# Patient Record
Sex: MALE | Race: BLACK OR AFRICAN AMERICAN | NOT HISPANIC OR LATINO | URBAN - METROPOLITAN AREA
[De-identification: names, ages, dates, MRNs, and addresses within clinical notes are randomized per-mention and may not be internally consistent; named-entity substitution may affect disease eponyms.]

---

## 2018-08-19 ENCOUNTER — EMERGENCY (EMERGENCY)
Facility: HOSPITAL | Age: 33
LOS: 0 days | Discharge: HOME | End: 2018-08-19
Attending: EMERGENCY MEDICINE
Payer: SELF-PAY

## 2018-08-19 VITALS
RESPIRATION RATE: 17 BRPM | DIASTOLIC BLOOD PRESSURE: 90 MMHG | SYSTOLIC BLOOD PRESSURE: 148 MMHG | OXYGEN SATURATION: 100 % | HEART RATE: 94 BPM

## 2018-08-19 VITALS
HEART RATE: 97 BPM | SYSTOLIC BLOOD PRESSURE: 164 MMHG | RESPIRATION RATE: 18 BRPM | WEIGHT: 315 LBS | HEIGHT: 73 IN | OXYGEN SATURATION: 96 % | DIASTOLIC BLOOD PRESSURE: 105 MMHG | TEMPERATURE: 98 F

## 2018-08-19 DIAGNOSIS — J45.41 MODERATE PERSISTENT ASTHMA WITH (ACUTE) EXACERBATION: ICD-10-CM

## 2018-08-19 DIAGNOSIS — R06.02 SHORTNESS OF BREATH: ICD-10-CM

## 2018-08-19 LAB
BASE EXCESS BLDA CALC-SCNC: -0.6 MMOL/L — SIGNIFICANT CHANGE UP (ref -2–2)
BLOOD GAS COMMENTS: SIGNIFICANT CHANGE UP
BLOOD GAS COMMENTS: SIGNIFICANT CHANGE UP
BLOOD GAS SOURCE: SIGNIFICANT CHANGE UP
HCO3 BLDA-SCNC: 23 MMOL/L — SIGNIFICANT CHANGE UP (ref 21–29)
HOROWITZ INDEX BLDA+IHG-RTO: 21 — SIGNIFICANT CHANGE UP
PCO2 BLDA: 38 MMHG — SIGNIFICANT CHANGE UP (ref 32–46)
PH BLD: 7.4 — SIGNIFICANT CHANGE UP (ref 7.35–7.45)
PO2 BLDA: 76 MMHG — SIGNIFICANT CHANGE UP (ref 74–108)
SAO2 % BLDA: 96 % — SIGNIFICANT CHANGE UP (ref 92–96)

## 2018-08-19 PROCEDURE — 99284 EMERGENCY DEPT VISIT MOD MDM: CPT

## 2018-08-19 PROCEDURE — 71045 X-RAY EXAM CHEST 1 VIEW: CPT | Mod: 26

## 2018-08-19 RX ORDER — ALBUTEROL 90 UG/1
2.5 AEROSOL, METERED ORAL
Qty: 0 | Refills: 0 | Status: COMPLETED | OUTPATIENT
Start: 2018-08-19 | End: 2018-08-19

## 2018-08-19 RX ORDER — ALBUTEROL 90 UG/1
2 AEROSOL, METERED ORAL
Qty: 1 | Refills: 0 | OUTPATIENT
Start: 2018-08-19 | End: 2018-08-20

## 2018-08-19 RX ADMIN — ALBUTEROL 2.5 MILLIGRAM(S): 90 AEROSOL, METERED ORAL at 14:03

## 2018-08-19 RX ADMIN — ALBUTEROL 2.5 MILLIGRAM(S): 90 AEROSOL, METERED ORAL at 13:35

## 2018-08-19 RX ADMIN — ALBUTEROL 2.5 MILLIGRAM(S): 90 AEROSOL, METERED ORAL at 13:50

## 2018-08-19 RX ADMIN — Medication 80 MILLIGRAM(S): at 14:03

## 2018-08-19 NOTE — ED ADULT NURSE NOTE - OBJECTIVE STATEMENT
received pt sitting on stretcher alert and oriented  c/o sob  on and off for the 3 weeks +cough denies any fever

## 2018-08-19 NOTE — ED PROVIDER NOTE - MEDICAL DECISION MAKING DETAILS
patient pw sob. this may be a cough variant of asthma given the absence of wheezing. will give nebs and steroids. patient pw sob. this may be a cough variant of asthma given the absence of wheezing. will give nebs and steroids. patient feels better after treatment. dc home.

## 2018-08-19 NOTE — ED PROVIDER NOTE - PHYSICAL EXAMINATION
Gen: Alert, NAD, morbidly obese  Head: NC, AT   Eyes: PERRL, EOMI, normal lids/conjunctiva  ENT: normal hearing, patent oropharynx without erythema/exudate, uvula midline  Neck: supple, no tenderness, Trachea midline  Pulm: Bilateral BS, normal resp effort, no wheeze/stridor/retractions. dry cough at bedside   CV: RRR, no M/R/G, 2+ radial and dp pulses bl, no edema  Abd: soft, NT/ND, +BS, no hepatosplenomegaly  Mskel: extremities x4 with normal ROM and no joint effusions. no ctl spine ttp.   Skin: no rash, no bruising   Neuro: AAOx3, no sensory/motor deficits, CN 2-12 intact

## 2018-08-19 NOTE — ED PROVIDER NOTE - OBJECTIVE STATEMENT
Pertinent PMH/PSH/FHx/SHx and Review of Systems contained within:  33F hx of asthma and morbid obesity pw sob ongoing x2 weeks. patient notes he has been treating symptoms with albuterol inhaler but without relief. he has been avoiding coming to the er because he does not like hospitals. he denies productive cough but has a dry one. no nausea, vomiting, fever, chills, rash, bleeding, ha, vision change, rhinorrhea. he is a non smoker   Fh and Sh not otherwise contributory  ROS otherwise negative

## 2018-08-20 PROBLEM — Z00.00 ENCOUNTER FOR PREVENTIVE HEALTH EXAMINATION: Status: ACTIVE | Noted: 2018-08-20

## 2018-08-20 RX ORDER — ALBUTEROL 90 UG/1
0 AEROSOL, METERED ORAL
Qty: 0 | Refills: 0 | COMMUNITY

## 2018-09-13 ENCOUNTER — EMERGENCY (EMERGENCY)
Facility: HOSPITAL | Age: 33
LOS: 1 days | Discharge: HOME | End: 2018-09-13
Attending: EMERGENCY MEDICINE
Payer: SELF-PAY

## 2018-09-13 VITALS
HEIGHT: 72 IN | HEART RATE: 74 BPM | SYSTOLIC BLOOD PRESSURE: 129 MMHG | OXYGEN SATURATION: 98 % | RESPIRATION RATE: 18 BRPM | DIASTOLIC BLOOD PRESSURE: 80 MMHG | WEIGHT: 315 LBS | TEMPERATURE: 98 F

## 2018-09-13 PROCEDURE — 73610 X-RAY EXAM OF ANKLE: CPT

## 2018-09-13 PROCEDURE — 73590 X-RAY EXAM OF LOWER LEG: CPT | Mod: 26,RT

## 2018-09-13 PROCEDURE — 99284 EMERGENCY DEPT VISIT MOD MDM: CPT

## 2018-09-13 PROCEDURE — 73590 X-RAY EXAM OF LOWER LEG: CPT

## 2018-09-13 PROCEDURE — 73610 X-RAY EXAM OF ANKLE: CPT | Mod: 26,RT

## 2018-09-13 RX ORDER — IBUPROFEN 200 MG
1 TABLET ORAL
Qty: 30 | Refills: 0
Start: 2018-09-13 | End: 2018-09-22

## 2018-09-13 RX ORDER — IBUPROFEN 200 MG
800 TABLET ORAL ONCE
Refills: 0 | Status: COMPLETED | OUTPATIENT
Start: 2018-09-13 | End: 2018-09-13

## 2018-09-13 RX ADMIN — Medication 800 MILLIGRAM(S): at 12:00

## 2018-09-13 NOTE — ED PROVIDER NOTE - OBJECTIVE STATEMENT
32 yo M pmh Asthma BIB EMS s/p injury while walking down bus steps. Pt states that his ankle got caught on back on step and twisted. Pt c/o pain to right leg and ankle. No weakness or paresthesias.

## 2018-09-13 NOTE — ED PROVIDER NOTE - MUSCULOSKELETAL MINIMAL EXAM
+ ttp prox tibia and diffuse ankle. NO defor. limited rom. No pain with foot or knee./RANGE OF MOTION LIMITED

## 2018-09-13 NOTE — ED ADULT TRIAGE NOTE - CHIEF COMPLAINT QUOTE
Hit foot on step while getting off bus.  heard crack in right lower leg/ankle.  Did not hit head, takes no blood thinners.  Right ankle splint in place.

## 2018-09-13 NOTE — ED PROVIDER NOTE - ATTENDING CONTRIBUTION TO CARE
seen with acp: well appearing male, NAD; right lower ext +ttp lateral knee/proximal fib; no deformity; no ankle/malleoli tenderness; +neurovasc intact; +fibular fracture noted, no medial malleoli fx; knee immobilizer, crutches, non weight bearing, outpt f/u with ortho

## 2018-09-14 PROBLEM — J45.909 UNSPECIFIED ASTHMA, UNCOMPLICATED: Chronic | Status: ACTIVE | Noted: 2018-08-19

## 2018-09-17 ENCOUNTER — APPOINTMENT (OUTPATIENT)
Dept: ORTHOPEDIC SURGERY | Facility: CLINIC | Age: 33
End: 2018-09-17
Payer: OTHER MISCELLANEOUS

## 2018-09-17 VITALS
DIASTOLIC BLOOD PRESSURE: 86 MMHG | WEIGHT: 315 LBS | HEART RATE: 98 BPM | BODY MASS INDEX: 42.66 KG/M2 | SYSTOLIC BLOOD PRESSURE: 126 MMHG | HEIGHT: 72 IN

## 2018-09-17 DIAGNOSIS — M25.579 PAIN IN UNSPECIFIED ANKLE AND JOINTS OF UNSPECIFIED FOOT: ICD-10-CM

## 2018-09-17 DIAGNOSIS — Z87.09 PERSONAL HISTORY OF OTHER DISEASES OF THE RESPIRATORY SYSTEM: ICD-10-CM

## 2018-09-17 PROCEDURE — 73610 X-RAY EXAM OF ANKLE: CPT | Mod: RT

## 2018-09-17 PROCEDURE — 27780 TREATMENT OF FIBULA FRACTURE: CPT | Mod: RT

## 2018-09-17 PROCEDURE — 99204 OFFICE O/P NEW MOD 45 MIN: CPT | Mod: 57

## 2018-09-17 RX ORDER — IBUPROFEN 600 MG
600 TABLET ORAL
Refills: 0 | Status: ACTIVE | COMMUNITY

## 2018-09-18 ENCOUNTER — OTHER (OUTPATIENT)
Age: 33
End: 2018-09-18

## 2018-09-26 ENCOUNTER — OTHER (OUTPATIENT)
Age: 33
End: 2018-09-26

## 2018-10-09 ENCOUNTER — APPOINTMENT (OUTPATIENT)
Dept: ORTHOPEDIC SURGERY | Facility: CLINIC | Age: 33
End: 2018-10-09
Payer: OTHER MISCELLANEOUS

## 2018-10-09 ENCOUNTER — OTHER (OUTPATIENT)
Age: 33
End: 2018-10-09

## 2018-10-09 VITALS
HEIGHT: 72 IN | SYSTOLIC BLOOD PRESSURE: 158 MMHG | WEIGHT: 315 LBS | BODY MASS INDEX: 42.66 KG/M2 | DIASTOLIC BLOOD PRESSURE: 100 MMHG | HEART RATE: 98 BPM

## 2018-10-09 DIAGNOSIS — S82.839A OTHER FRACTURE OF UPPER AND LOWER END OF UNSPECIFIED FIBULA, INITIAL ENCOUNTER FOR CLOSED FRACTURE: ICD-10-CM

## 2018-10-09 PROCEDURE — 99024 POSTOP FOLLOW-UP VISIT: CPT

## 2018-10-09 RX ORDER — TRAMADOL HYDROCHLORIDE 50 MG/1
50 TABLET, COATED ORAL
Qty: 30 | Refills: 0 | Status: ACTIVE | COMMUNITY
Start: 2018-10-09 | End: 1900-01-01

## 2018-10-10 ENCOUNTER — APPOINTMENT (OUTPATIENT)
Dept: ORTHOPEDIC SURGERY | Facility: CLINIC | Age: 33
End: 2018-10-10
Payer: OTHER MISCELLANEOUS

## 2018-10-10 VITALS
BODY MASS INDEX: 42.66 KG/M2 | HEART RATE: 97 BPM | HEIGHT: 72 IN | WEIGHT: 315 LBS | SYSTOLIC BLOOD PRESSURE: 149 MMHG | DIASTOLIC BLOOD PRESSURE: 96 MMHG

## 2018-10-10 DIAGNOSIS — S93.431A SPRAIN OF TIBIOFIBULAR LIGAMENT OF RIGHT ANKLE, INITIAL ENCOUNTER: ICD-10-CM

## 2018-10-10 PROCEDURE — 73610 X-RAY EXAM OF ANKLE: CPT | Mod: RT

## 2018-10-10 PROCEDURE — 77071 MNL APPL STRS JT RADIOGRAPHY: CPT

## 2018-10-10 PROCEDURE — 99214 OFFICE O/P EST MOD 30 MIN: CPT

## 2018-10-24 ENCOUNTER — OUTPATIENT (OUTPATIENT)
Dept: OUTPATIENT SERVICES | Facility: HOSPITAL | Age: 33
LOS: 1 days | End: 2018-10-24
Payer: COMMERCIAL

## 2018-10-24 VITALS — HEIGHT: 72 IN | WEIGHT: 315 LBS

## 2018-10-24 DIAGNOSIS — Z83.2 FAMILY HISTORY OF DISEASES OF THE BLOOD AND BLOOD-FORMING ORGANS AND CERTAIN DISORDERS INVOLVING THE IMMUNE MECHANISM: Chronic | ICD-10-CM

## 2018-10-24 DIAGNOSIS — S93.431A SPRAIN OF TIBIOFIBULAR LIGAMENT OF RIGHT ANKLE, INITIAL ENCOUNTER: ICD-10-CM

## 2018-10-24 DIAGNOSIS — Z29.9 ENCOUNTER FOR PROPHYLACTIC MEASURES, UNSPECIFIED: ICD-10-CM

## 2018-10-24 DIAGNOSIS — J45.909 UNSPECIFIED ASTHMA, UNCOMPLICATED: ICD-10-CM

## 2018-10-24 DIAGNOSIS — Z01.818 ENCOUNTER FOR OTHER PREPROCEDURAL EXAMINATION: ICD-10-CM

## 2018-10-24 DIAGNOSIS — Z82.49 FAMILY HISTORY OF ISCHEMIC HEART DISEASE AND OTHER DISEASES OF THE CIRCULATORY SYSTEM: Chronic | ICD-10-CM

## 2018-10-24 LAB
ANION GAP SERPL CALC-SCNC: 13 MMOL/L — SIGNIFICANT CHANGE UP (ref 5–17)
APTT BLD: 31.8 SEC — SIGNIFICANT CHANGE UP (ref 27.5–37.4)
BASOPHILS # BLD AUTO: 0 K/UL — SIGNIFICANT CHANGE UP (ref 0–0.2)
BASOPHILS NFR BLD AUTO: 0.3 % — SIGNIFICANT CHANGE UP (ref 0–2)
BUN SERPL-MCNC: 10 MG/DL — SIGNIFICANT CHANGE UP (ref 8–20)
CALCIUM SERPL-MCNC: 9.9 MG/DL — SIGNIFICANT CHANGE UP (ref 8.6–10.2)
CHLORIDE SERPL-SCNC: 100 MMOL/L — SIGNIFICANT CHANGE UP (ref 98–107)
CO2 SERPL-SCNC: 27 MMOL/L — SIGNIFICANT CHANGE UP (ref 22–29)
CREAT SERPL-MCNC: 0.77 MG/DL — SIGNIFICANT CHANGE UP (ref 0.5–1.3)
EOSINOPHIL # BLD AUTO: 0.2 K/UL — SIGNIFICANT CHANGE UP (ref 0–0.5)
EOSINOPHIL NFR BLD AUTO: 2.1 % — SIGNIFICANT CHANGE UP (ref 0–6)
GLUCOSE SERPL-MCNC: 111 MG/DL — SIGNIFICANT CHANGE UP (ref 70–115)
HCT VFR BLD CALC: 44.8 % — SIGNIFICANT CHANGE UP (ref 42–52)
HGB BLD-MCNC: 14.7 G/DL — SIGNIFICANT CHANGE UP (ref 14–18)
INR BLD: 1.04 RATIO — SIGNIFICANT CHANGE UP (ref 0.88–1.16)
LYMPHOCYTES # BLD AUTO: 2.9 K/UL — SIGNIFICANT CHANGE UP (ref 1–4.8)
LYMPHOCYTES # BLD AUTO: 39.6 % — SIGNIFICANT CHANGE UP (ref 20–55)
MCHC RBC-ENTMCNC: 26.2 PG — LOW (ref 27–31)
MCHC RBC-ENTMCNC: 32.8 G/DL — SIGNIFICANT CHANGE UP (ref 32–36)
MCV RBC AUTO: 79.9 FL — LOW (ref 80–94)
MONOCYTES # BLD AUTO: 0.7 K/UL — SIGNIFICANT CHANGE UP (ref 0–0.8)
MONOCYTES NFR BLD AUTO: 9.2 % — SIGNIFICANT CHANGE UP (ref 3–10)
NEUTROPHILS # BLD AUTO: 3.5 K/UL — SIGNIFICANT CHANGE UP (ref 1.8–8)
NEUTROPHILS NFR BLD AUTO: 48.8 % — SIGNIFICANT CHANGE UP (ref 37–73)
PLATELET # BLD AUTO: 305 K/UL — SIGNIFICANT CHANGE UP (ref 150–400)
POTASSIUM SERPL-MCNC: 4.2 MMOL/L — SIGNIFICANT CHANGE UP (ref 3.5–5.3)
POTASSIUM SERPL-SCNC: 4.2 MMOL/L — SIGNIFICANT CHANGE UP (ref 3.5–5.3)
PROTHROM AB SERPL-ACNC: 11.4 SEC — SIGNIFICANT CHANGE UP (ref 9.8–12.7)
RBC # BLD: 5.61 M/UL — SIGNIFICANT CHANGE UP (ref 4.6–6.2)
RBC # FLD: 13.9 % — SIGNIFICANT CHANGE UP (ref 11–15.6)
SODIUM SERPL-SCNC: 140 MMOL/L — SIGNIFICANT CHANGE UP (ref 135–145)
WBC # BLD: 7.2 K/UL — SIGNIFICANT CHANGE UP (ref 4.8–10.8)
WBC # FLD AUTO: 7.2 K/UL — SIGNIFICANT CHANGE UP (ref 4.8–10.8)

## 2018-10-24 PROCEDURE — 85730 THROMBOPLASTIN TIME PARTIAL: CPT

## 2018-10-24 PROCEDURE — G0463: CPT

## 2018-10-24 PROCEDURE — 85027 COMPLETE CBC AUTOMATED: CPT

## 2018-10-24 PROCEDURE — 80048 BASIC METABOLIC PNL TOTAL CA: CPT

## 2018-10-24 PROCEDURE — 36415 COLL VENOUS BLD VENIPUNCTURE: CPT

## 2018-10-24 PROCEDURE — 85610 PROTHROMBIN TIME: CPT

## 2018-10-24 NOTE — H&P PST ADULT - NSANTHOSAYNRD_GEN_A_CORE
No. JAKUB screening performed.  STOP BANG Legend: 0-2 = LOW Risk; 3-4 = INTERMEDIATE Risk; 5-8 = HIGH Risk

## 2018-10-24 NOTE — H&P PST ADULT - HISTORY OF PRESENT ILLNESS
34 y/o morbidly obese pt seen today for pre-op repair syndesmosis right ankle. Pt a , fell, twisting his right lower extremity. Was seen at [facility]  ED with a follow-up Orthopedic. Pt now requires surgery due to worsening right lower extremity/ lumbar pain, difficulty with balance and activities of daily living. Accompanied today by his mother, KATHE Cortes in used.

## 2018-10-24 NOTE — H&P PST ADULT - ACTIVITY
no formal exercise, active, no  sob with climbing up the stairs no formal exercise, inactive, sob with climbing up the stairs

## 2018-10-24 NOTE — H&P PST ADULT - FAMILY HISTORY
Mother  Still living? Yes, Estimated age: 31-40  Family history of systemic lupus erythematosus (SLE) in mother, Age at diagnosis: Age Unknown  Family history of hypertension in mother, Age at diagnosis: Age Unknown     Father  Still living? Yes, Estimated age: Age Unknown  Family history of hypertension in mother, Age at diagnosis: Age Unknown

## 2018-10-24 NOTE — H&P PST ADULT - ASSESSMENT
CAPRINI SCORE [CLOT]    AGE RELATED RISK FACTORS                                                       MOBILITY RELATED FACTORS  [ ] Age 41-60 years                                            (1 Point)                  [ ] Bed rest                                                        (1 Point)  [ ] Age: 61-74 years                                           (2 Points)                 [ ] Plaster cast                                                   (2 Points)  [ ] Age= 75 years                                              (3 Points)                 [ ] Bed bound for more than 72 hours                 (2 Points)    DISEASE RELATED RISK FACTORS                                               GENDER SPECIFIC FACTORS  [ x] Edema in the lower extremities                       (1 Point)                  [ ] Pregnancy                                                     (1 Point)  [ ] Varicose veins                                               (1 Point)                  [ ] Post-partum < 6 weeks                                   (1 Point)             [ x] BMI > 25 Kg/m2                                            (1 Point)                  [ ] Hormonal therapy  or oral contraception          (1 Point)                 [ ] Sepsis (in the previous month)                        (1 Point)                  [ ] History of pregnancy complications                 (1 point)  [ ] Pneumonia or serious lung disease                                               [ ] Unexplained or recurrent                     (1 Point)           (in the previous month)                               (1 Point)  [ ] Abnormal pulmonary function test                     (1 Point)                 SURGERY RELATED RISK FACTORS  [ ] Acute myocardial infarction                              (1 Point)                 [ ]  Section                                             (1 Point)  [ ] Congestive heart failure (in the previous month)  (1 Point)               [ ] Minor surgery                                                  (1 Point)   [ ] Inflammatory bowel disease                             (1 Point)                 [ ] Arthroscopic surgery                                        (2 Points)  [ ] Central venous access                                      (2 Points)                [x ] General surgery lasting more than 45 minutes   (2 Points)       [ ] Stroke (in the previous month)                          (5 Points)               [ ] Elective arthroplasty                                         (5 Points)                                                                                                                                               HEMATOLOGY RELATED FACTORS                                                 TRAUMA RELATED RISK FACTORS  [ ] Prior episodes of VTE                                     (3 Points)                [ ] Fracture of the hip, pelvis, or leg                       (5 Points)  [ ] Positive family history for VTE                         (3 Points)                 [ ] Acute spinal cord injury (in the previous month)  (5 Points)  [ ] Prothrombin 82196 A                                     (3 Points)                 [ ] Paralysis  (less than 1 month)                             (5 Points)  [ ] Factor V Leiden                                             (3 Points)                  [ ] Multiple Trauma within 1 month                        (5 Points)  [ ] Lupus anticoagulants                                     (3 Points)                                                           [ ] Anticardiolipin antibodies                               (3 Points)                                                       [ ] High homocysteine in the blood                      (3 Points)                                             [ ] Other congenital or acquired thrombophilia      (3 Points)                                                [ ] Heparin induced thrombocytopenia                  (3 Points)                                          Total Score [  4        ] 32 y/o male seen today for pre-op Repair syndesmosis right ankle, surgery protocol reviewed with pt today, pt to follow-up with PCP for medical clearance   CAPRINI SCORE [CLOT]    AGE RELATED RISK FACTORS                                                       MOBILITY RELATED FACTORS  [ ] Age 41-60 years                                            (1 Point)                  [ ] Bed rest                                                        (1 Point)  [ ] Age: 61-74 years                                           (2 Points)                 [ ] Plaster cast                                                   (2 Points)  [ ] Age= 75 years                                              (3 Points)                 [ ] Bed bound for more than 72 hours                 (2 Points)    DISEASE RELATED RISK FACTORS                                               GENDER SPECIFIC FACTORS  [ x] Edema in the lower extremities                       (1 Point)                  [ ] Pregnancy                                                     (1 Point)  [ ] Varicose veins                                               (1 Point)                  [ ] Post-partum < 6 weeks                                   (1 Point)             [ x] BMI > 25 Kg/m2                                            (1 Point)                  [ ] Hormonal therapy  or oral contraception          (1 Point)                 [ ] Sepsis (in the previous month)                        (1 Point)                  [ ] History of pregnancy complications                 (1 point)  [ ] Pneumonia or serious lung disease                                               [ ] Unexplained or recurrent                     (1 Point)           (in the previous month)                               (1 Point)  [ ] Abnormal pulmonary function test                     (1 Point)                 SURGERY RELATED RISK FACTORS  [ ] Acute myocardial infarction                              (1 Point)                 [ ]  Section                                             (1 Point)  [ ] Congestive heart failure (in the previous month)  (1 Point)               [ ] Minor surgery                                                  (1 Point)   [ ] Inflammatory bowel disease                             (1 Point)                 [ ] Arthroscopic surgery                                        (2 Points)  [ ] Central venous access                                      (2 Points)                [x ] General surgery lasting more than 45 minutes   (2 Points)       [ ] Stroke (in the previous month)                          (5 Points)               [ ] Elective arthroplasty                                         (5 Points)                                                                                                                                               HEMATOLOGY RELATED FACTORS                                                 TRAUMA RELATED RISK FACTORS  [ ] Prior episodes of VTE                                     (3 Points)                [ ] Fracture of the hip, pelvis, or leg                       (5 Points)  [ ] Positive family history for VTE                         (3 Points)                 [ ] Acute spinal cord injury (in the previous month)  (5 Points)  [ ] Prothrombin 79227 A                                     (3 Points)                 [ ] Paralysis  (less than 1 month)                             (5 Points)  [ ] Factor V Leiden                                             (3 Points)                  [ ] Multiple Trauma within 1 month                        (5 Points)  [ ] Lupus anticoagulants                                     (3 Points)                                                           [ ] Anticardiolipin antibodies                               (3 Points)                                                       [ ] High homocysteine in the blood                      (3 Points)                                             [ ] Other congenital or acquired thrombophilia      (3 Points)                                                [ ] Heparin induced thrombocytopenia                  (3 Points)                                          Total Score [  4        ]

## 2018-10-25 ENCOUNTER — TRANSCRIPTION ENCOUNTER (OUTPATIENT)
Age: 33
End: 2018-10-25

## 2018-10-25 ENCOUNTER — FORM ENCOUNTER (OUTPATIENT)
Age: 33
End: 2018-10-25

## 2018-10-26 ENCOUNTER — OUTPATIENT (OUTPATIENT)
Dept: INPATIENT UNIT | Facility: HOSPITAL | Age: 33
LOS: 1 days | End: 2018-10-26
Payer: COMMERCIAL

## 2018-10-26 ENCOUNTER — APPOINTMENT (OUTPATIENT)
Dept: ORTHOPEDIC SURGERY | Facility: HOSPITAL | Age: 33
End: 2018-10-26
Payer: OTHER MISCELLANEOUS

## 2018-10-26 VITALS
TEMPERATURE: 97 F | RESPIRATION RATE: 22 BRPM | DIASTOLIC BLOOD PRESSURE: 60 MMHG | HEART RATE: 89 BPM | SYSTOLIC BLOOD PRESSURE: 117 MMHG | OXYGEN SATURATION: 97 %

## 2018-10-26 VITALS
HEART RATE: 105 BPM | TEMPERATURE: 97 F | OXYGEN SATURATION: 98 % | DIASTOLIC BLOOD PRESSURE: 95 MMHG | SYSTOLIC BLOOD PRESSURE: 127 MMHG | WEIGHT: 315 LBS | RESPIRATION RATE: 18 BRPM | HEIGHT: 72 IN

## 2018-10-26 DIAGNOSIS — S93.431A SPRAIN OF TIBIOFIBULAR LIGAMENT OF RIGHT ANKLE, INITIAL ENCOUNTER: ICD-10-CM

## 2018-10-26 PROCEDURE — 27784 TREATMENT OF FIBULA FRACTURE: CPT | Mod: RT

## 2018-10-26 PROCEDURE — ZZZZZ: CPT

## 2018-10-26 PROCEDURE — 27814 TREATMENT OF ANKLE FRACTURE: CPT | Mod: RT

## 2018-10-26 PROCEDURE — 27829 TREAT LOWER LEG JOINT: CPT | Mod: RT

## 2018-10-26 PROCEDURE — C1713: CPT

## 2018-10-26 PROCEDURE — 76000 FLUOROSCOPY <1 HR PHYS/QHP: CPT | Mod: 26

## 2018-10-26 RX ORDER — ONDANSETRON 8 MG/1
4 TABLET, FILM COATED ORAL ONCE
Refills: 0 | Status: COMPLETED | OUTPATIENT
Start: 2018-10-26 | End: 2018-10-26

## 2018-10-26 RX ORDER — ASPIRIN/CALCIUM CARB/MAGNESIUM 324 MG
1 TABLET ORAL
Qty: 60 | Refills: 0
Start: 2018-10-26 | End: 2018-11-24

## 2018-10-26 RX ORDER — SODIUM CHLORIDE 9 MG/ML
1000 INJECTION, SOLUTION INTRAVENOUS
Refills: 0 | Status: DISCONTINUED | OUTPATIENT
Start: 2018-10-26 | End: 2018-10-26

## 2018-10-26 RX ORDER — CEFAZOLIN SODIUM 1 G
3000 VIAL (EA) INJECTION ONCE
Refills: 0 | Status: DISCONTINUED | OUTPATIENT
Start: 2018-10-26 | End: 2018-10-26

## 2018-10-26 RX ORDER — KETOROLAC TROMETHAMINE 30 MG/ML
30 SYRINGE (ML) INJECTION ONCE
Refills: 0 | Status: DISCONTINUED | OUTPATIENT
Start: 2018-10-26 | End: 2018-10-26

## 2018-10-26 RX ORDER — FENTANYL CITRATE 50 UG/ML
50 INJECTION INTRAVENOUS
Refills: 0 | Status: DISCONTINUED | OUTPATIENT
Start: 2018-10-26 | End: 2018-10-26

## 2018-10-26 RX ADMIN — FENTANYL CITRATE 50 MICROGRAM(S): 50 INJECTION INTRAVENOUS at 16:09

## 2018-10-26 RX ADMIN — FENTANYL CITRATE 50 MICROGRAM(S): 50 INJECTION INTRAVENOUS at 16:20

## 2018-10-26 RX ADMIN — FENTANYL CITRATE 50 MICROGRAM(S): 50 INJECTION INTRAVENOUS at 16:59

## 2018-10-26 RX ADMIN — ONDANSETRON 4 MILLIGRAM(S): 8 TABLET, FILM COATED ORAL at 16:17

## 2018-10-26 RX ADMIN — Medication 30 MILLIGRAM(S): at 16:59

## 2018-10-26 NOTE — BRIEF OPERATIVE NOTE - PROCEDURE
<<-----Click on this checkbox to enter Procedure Fixation of syndesmosis of ankle  10/26/2018    Active  MNEGEM

## 2018-10-26 NOTE — ASU DISCHARGE PLAN (ADULT/PEDIATRIC). - MEDICATION SUMMARY - MEDICATIONS TO TAKE
I will START or STAY ON the medications listed below when I get home from the hospital:    Ecotrin 325 mg oral delayed release tablet  -- 1 tab(s) by mouth 2 times a day   -- Swallow whole.  Do not crush.  Take with food or milk.    -- Indication: For Sprain of tibiofibular ligament of right ankle    Tylenol 500 mg oral tablet  -- 2 tab(s) by mouth every 6 hours, As Needed  -- Indication: For Sprain of tibiofibular ligament of right ankle    albuterol  -- Indication: For Sprain of tibiofibular ligament of right ankle

## 2018-10-30 ENCOUNTER — OTHER (OUTPATIENT)
Age: 33
End: 2018-10-30

## 2018-11-06 ENCOUNTER — OTHER (OUTPATIENT)
Age: 33
End: 2018-11-06

## 2018-11-07 ENCOUNTER — OUTPATIENT (OUTPATIENT)
Dept: OUTPATIENT SERVICES | Facility: HOSPITAL | Age: 33
LOS: 1 days | End: 2018-11-07

## 2018-11-07 DIAGNOSIS — Z51.89 ENCOUNTER FOR OTHER SPECIFIED AFTERCARE: ICD-10-CM

## 2018-11-15 ENCOUNTER — APPOINTMENT (OUTPATIENT)
Dept: ORTHOPEDIC SURGERY | Facility: CLINIC | Age: 33
End: 2018-11-15
Payer: OTHER MISCELLANEOUS

## 2018-11-15 VITALS
DIASTOLIC BLOOD PRESSURE: 95 MMHG | BODY MASS INDEX: 42.66 KG/M2 | HEIGHT: 72 IN | HEART RATE: 94 BPM | SYSTOLIC BLOOD PRESSURE: 142 MMHG | WEIGHT: 315 LBS

## 2018-11-15 PROCEDURE — 99024 POSTOP FOLLOW-UP VISIT: CPT

## 2018-11-20 ENCOUNTER — OUTPATIENT (OUTPATIENT)
Dept: OUTPATIENT SERVICES | Facility: HOSPITAL | Age: 33
LOS: 1 days | End: 2018-11-20
Payer: COMMERCIAL

## 2018-11-20 DIAGNOSIS — S93.431D SPRAIN OF TIBIOFIBULAR LIGAMENT OF RIGHT ANKLE, SUBSEQUENT ENCOUNTER: ICD-10-CM

## 2018-11-20 DIAGNOSIS — Z51.89 ENCOUNTER FOR OTHER SPECIFIED AFTERCARE: ICD-10-CM

## 2018-12-03 ENCOUNTER — OTHER (OUTPATIENT)
Age: 33
End: 2018-12-03

## 2018-12-05 ENCOUNTER — APPOINTMENT (OUTPATIENT)
Dept: ORTHOPEDIC SURGERY | Facility: CLINIC | Age: 33
End: 2018-12-05
Payer: OTHER MISCELLANEOUS

## 2018-12-05 VITALS
DIASTOLIC BLOOD PRESSURE: 98 MMHG | HEART RATE: 106 BPM | BODY MASS INDEX: 42.66 KG/M2 | SYSTOLIC BLOOD PRESSURE: 147 MMHG | HEIGHT: 72 IN | WEIGHT: 315 LBS

## 2018-12-05 PROCEDURE — 73610 X-RAY EXAM OF ANKLE: CPT | Mod: RT

## 2018-12-05 PROCEDURE — 99024 POSTOP FOLLOW-UP VISIT: CPT

## 2019-01-03 ENCOUNTER — OTHER (OUTPATIENT)
Age: 34
End: 2019-01-03

## 2019-01-07 ENCOUNTER — APPOINTMENT (OUTPATIENT)
Dept: ORTHOPEDIC SURGERY | Facility: CLINIC | Age: 34
End: 2019-01-07
Payer: OTHER MISCELLANEOUS

## 2019-01-07 VITALS
HEART RATE: 91 BPM | DIASTOLIC BLOOD PRESSURE: 99 MMHG | SYSTOLIC BLOOD PRESSURE: 139 MMHG | BODY MASS INDEX: 42.66 KG/M2 | WEIGHT: 315 LBS | HEIGHT: 72 IN

## 2019-01-07 PROCEDURE — 99024 POSTOP FOLLOW-UP VISIT: CPT

## 2019-01-09 ENCOUNTER — OTHER (OUTPATIENT)
Age: 34
End: 2019-01-09

## 2019-02-05 ENCOUNTER — APPOINTMENT (OUTPATIENT)
Dept: ORTHOPEDIC SURGERY | Facility: CLINIC | Age: 34
End: 2019-02-05
Payer: OTHER MISCELLANEOUS

## 2019-02-05 VITALS
TEMPERATURE: 98.4 F | HEART RATE: 91 BPM | BODY MASS INDEX: 42.66 KG/M2 | HEIGHT: 72 IN | WEIGHT: 315 LBS | SYSTOLIC BLOOD PRESSURE: 142 MMHG | DIASTOLIC BLOOD PRESSURE: 98 MMHG

## 2019-02-05 PROCEDURE — 73610 X-RAY EXAM OF ANKLE: CPT | Mod: RT

## 2019-02-05 PROCEDURE — 99214 OFFICE O/P EST MOD 30 MIN: CPT

## 2019-02-05 NOTE — DISCUSSION/SUMMARY
[de-identified] : 32yo s/p right ankle syndesmosis repair .  Overall he is doing reasonably well but has had a setback consistent with an ankle sprain.\par \par The patient presents with an acute inversion injury to the right ankle with an injury to the medial ligamentous complex. I outlined a treatment protocol that will require relative rest over the next 2 weeks. In addition, I discussed the spectrum of sprain injuries; the majority of which responded well to nonoperative modalities of treatment, and the possible indication for surgical management if the ankle becomes chronically and grossly unstable. Nonoperative modalities of treatment include relative rest over the next 2 weeks, NSAID's, ice, compressive wraps and gradual return to weight bearing.\par \par If they are still symptomatic in a few weeks, they may return for repeat evaluation to determine whether lace-up ankle bracing is required for return to activity versus formal physical therapy for balance/proprioceptive training if the patient is still struggling with their injury. At this time, radiographs show no evidence of fracture, and I have recommended weightbearing as tolerated. We'll see the patient back as needed to determine further treatment modalities if needed.\par \par The patient was given the opportunity to ask questions and all questions were answered to their satisfaction.\par \par Brian Canada MD\par Orthopaedic Trauma Surgeon\par [facility]\par [facility] [place]\par \par \par \par

## 2019-02-05 NOTE — HISTORY OF PRESENT ILLNESS
[de-identified] : 33-year-old male presents status post ORIF of the right ankle DOS 10/16/18 .  He reports about 3 weeks ago his ankle gave out causing pain to the medial aspect of the ankle. He reports he has been taking NSAID/tylenol/tramadol with some relief of the pain.  he has not returned to work and is apprehensive to RTW due to the ankle giving out. \par \par The patient states the pain is made worse with activity and relieved with rest. aching, 4/10

## 2019-02-05 NOTE — PHYSICAL EXAM
[de-identified] : Physical Exam:\par General: Well appearing, no acute distress, A&O\par Neurologic: A&Ox3, No focal deficits\par Head: NCAT without abrasions, lacerations, or ecchymosis to head, face, or scalp\par Eyes: No scleral icterus, no gross abnormalities\par Respiratory: Equal chest wall expansion bilaterally, no accessory muscle use\par Lymphatic: No lymphadenopathy palpated\par Skin: Warm and dry\par Psychiatric: Normal mood and affect\par \par RLE exam Mild TTP to the medial aspect of the R ankle Ankle ROM without significant pain.  Dorsiflexion 20 degrees plantar flexion 30 degrees.  distal motor and sensation intact.  [de-identified] : Plain films of the right ankle were obtained today and compared to imaging from previous visit. There are 2 syndesmosis fixation devices without loss of alignment

## 2019-02-14 ENCOUNTER — OTHER (OUTPATIENT)
Age: 34
End: 2019-02-14

## 2019-02-26 ENCOUNTER — APPOINTMENT (OUTPATIENT)
Dept: ORTHOPEDIC SURGERY | Facility: CLINIC | Age: 34
End: 2019-02-26
Payer: OTHER MISCELLANEOUS

## 2019-02-26 VITALS
SYSTOLIC BLOOD PRESSURE: 160 MMHG | WEIGHT: 315 LBS | BODY MASS INDEX: 42.66 KG/M2 | DIASTOLIC BLOOD PRESSURE: 114 MMHG | HEART RATE: 96 BPM | HEIGHT: 72 IN | TEMPERATURE: 98.6 F

## 2019-02-26 PROCEDURE — 99214 OFFICE O/P EST MOD 30 MIN: CPT

## 2019-02-26 PROCEDURE — 73610 X-RAY EXAM OF ANKLE: CPT | Mod: RT

## 2019-02-26 NOTE — PHYSICAL EXAM
[de-identified] : Physical Exam:\par General: Well appearing, no acute distress, A&O\par Neurologic: A&Ox3, No focal deficits\par Head: NCAT without abrasions, lacerations, or ecchymosis to head, face, or scalp\par Respiratory: Equal chest wall expansion bilaterally, no accessory muscle use\par Lymphatic: No lymphadenopathy palpated\par Skin: Warm and dry\par Psychiatric: Normal mood and affect\par \par Right ankle exam\par \par Skin: Clean, dry, intact\par Inspection: No obvious malalignment, moderate swelling, no effusion\par Pulses: 2+ DP/PT pulses\par ROM: RIGHT neutral degrees of dorsiflexion limited by pain, [\par Tenderness: No tenderness over the lateral malleolus, + CFL/ATFL/PTFL pain. + medial malleolus pain, no deltoid ligament pain. + proximal fibular pain. No heel pain.\par Stability: Negative anterior/posterior drawer.\par Strength: 5/5 TA/GS/EHL\par Neuro: In tact to light touch throughout [de-identified] : Plain films of the right ankle were obtained today and compared to imaging from previous visit. There are 2 syndesmosis fixation devices without loss of alignment

## 2019-02-26 NOTE — DISCUSSION/SUMMARY
[de-identified] : 32yo s/p right ankle syndesmosis repair.  Overall he is doing reasonably well but has had issues with continued ankle pain.  I agree with the SERGIO that he has recovered significantly but is still not able to return to full duty. The SERGIO recommended temporary partial 33% disability and recommended light duty only. I feel this is appropriate for now. He'll followup as recommended by workers compensation.\par \par The patient was given the opportunity to ask questions and all questions were answered to their satisfaction.\par \par Brian Canada MD\par Orthopaedic Trauma Surgeon\par [facility]\par [facility] [place]\par \par \par \par

## 2019-02-26 NOTE — HISTORY OF PRESENT ILLNESS
[Bending] : worsened by bending [Lifting] : worsened by lifting [Recumbency] : relieved by recumbency [Rest] : relieved by rest [de-identified] : 33-year-old male presents status post ORIF of the right ankle DOS 10/16/18 .  He reports that his ankle has been improving but does have pain to the medial aspect of the ankle. He reports he has been taking NSAID/tylenol/tramadol with some relief of the pain.  he has not returned to work and is apprehensive to RTW due to the ankle giving out. He saw the workers comp SERGIO who agreed with the plan.\par \par The patient states the pain is made worse with activity and relieved with rest. aching, 3/10

## 2019-03-25 ENCOUNTER — APPOINTMENT (OUTPATIENT)
Dept: ORTHOPEDIC SURGERY | Facility: CLINIC | Age: 34
End: 2019-03-25
Payer: OTHER MISCELLANEOUS

## 2019-03-25 VITALS
SYSTOLIC BLOOD PRESSURE: 153 MMHG | BODY MASS INDEX: 42.66 KG/M2 | HEIGHT: 72 IN | WEIGHT: 315 LBS | DIASTOLIC BLOOD PRESSURE: 101 MMHG | HEART RATE: 94 BPM

## 2019-03-25 DIAGNOSIS — S82.891D OTHER FRACTURE OF RIGHT LOWER LEG, SUBSEQUENT ENCOUNTER FOR CLOSED FRACTURE WITH ROUTINE HEALING: ICD-10-CM

## 2019-03-25 PROCEDURE — 73610 X-RAY EXAM OF ANKLE: CPT | Mod: RT

## 2019-03-25 PROCEDURE — 99214 OFFICE O/P EST MOD 30 MIN: CPT

## 2019-03-25 NOTE — REASON FOR VISIT
[Follow-Up Visit] : a follow-up visit for [Other: ____] : [unfilled] [FreeTextEntry2] : S/P ORIFof right ankle fracture.\par  Repair of right syndesmosis injury. DOS:10/26/18\par

## 2019-03-25 NOTE — HISTORY OF PRESENT ILLNESS
[de-identified] : 33-year-old male presents status post ORIF of the right ankle DOS 10/16/18 .  He reports that his ankle has been improving but does have pain to the medial aspect of the ankle. He reports he has been taking NSAID/tylenol/tramadol with some relief of the pain.  he has returned to work light duty and is apprehensive to RTW full duty quite yet due to the ankle giving out. He saw the workers comp SERGIO who agreed with the plan.\par \par The patient states the pain is made worse with activity and relieved with rest. aching, 3/10

## 2019-03-25 NOTE — DISCUSSION/SUMMARY
[de-identified] : 32yo s/p right ankle syndesmosis repair.  Overall he is doing reasonably well but has had issues with continued ankle pain.  I agree with the SERGIO that he has recovered significantly but is still not able to return to full duty. The SERGIO recommended temporary partial 33% disability and recommended light duty only but may advance as tolerated. I feel this is appropriate for now. He'll followup as recommended by workers compensation.\par \par The patient was given the opportunity to ask questions and all questions were answered to their satisfaction.\par \par Brian Canada MD\par Orthopaedic Trauma Surgeon\par [facility]\par [facility] [place]\par \par \par \par

## 2019-03-25 NOTE — PHYSICAL EXAM
[de-identified] : Physical Exam:\par General: Well appearing, no acute distress, A&O\par Neurologic: A&Ox3, No focal deficits\par Head: NCAT without abrasions, lacerations, or ecchymosis to head, face, or scalp\par Respiratory: Equal chest wall expansion bilaterally, no accessory muscle use\par Lymphatic: No lymphadenopathy palpated\par Skin: Warm and dry\par Psychiatric: Normal mood and affect\par \par Right ankle exam\par \par Skin: Clean, dry, intact\par Inspection: No obvious malalignment, moderate swelling, no effusion\par Pulses: 2+ DP/PT pulses\par ROM: RIGHT neutral degrees of dorsiflexion limited by pain, \par Tenderness: No tenderness over the lateral malleolus, + CFL/ATFL/PTFL pain. + medial malleolus pain, no deltoid ligament pain. + proximal fibular pain. No heel pain.\par Stability: Negative anterior/posterior drawer.\par Strength: 5/5 TA/GS/EHL\par Neuro: In tact to light touch throughout [de-identified] : Plain films of the right ankle were obtained today and compared to imaging from previous visit. There are 2 syndesmosis fixation devices without loss of alignment

## 2019-04-27 PROCEDURE — 97010 HOT OR COLD PACKS THERAPY: CPT

## 2019-04-27 PROCEDURE — 97140 MANUAL THERAPY 1/> REGIONS: CPT

## 2019-04-27 PROCEDURE — 97110 THERAPEUTIC EXERCISES: CPT

## 2019-04-27 PROCEDURE — 97162 PT EVAL MOD COMPLEX 30 MIN: CPT

## 2019-04-30 ENCOUNTER — OUTPATIENT (OUTPATIENT)
Dept: OUTPATIENT SERVICES | Facility: HOSPITAL | Age: 34
LOS: 1 days | End: 2019-04-30
Payer: COMMERCIAL

## 2019-05-22 PROCEDURE — 97140 MANUAL THERAPY 1/> REGIONS: CPT

## 2019-05-22 PROCEDURE — 97010 HOT OR COLD PACKS THERAPY: CPT

## 2019-05-22 PROCEDURE — 97110 THERAPEUTIC EXERCISES: CPT

## 2019-06-17 DIAGNOSIS — S93.431D SPRAIN OF TIBIOFIBULAR LIGAMENT OF RIGHT ANKLE, SUBSEQUENT ENCOUNTER: ICD-10-CM

## 2019-06-19 ENCOUNTER — OTHER (OUTPATIENT)
Age: 34
End: 2019-06-19

## 2019-06-25 ENCOUNTER — OTHER (OUTPATIENT)
Age: 34
End: 2019-06-25

## 2019-09-17 ENCOUNTER — OTHER (OUTPATIENT)
Age: 34
End: 2019-09-17

## 2019-09-18 ENCOUNTER — APPOINTMENT (OUTPATIENT)
Dept: ORTHOPEDIC SURGERY | Facility: CLINIC | Age: 34
End: 2019-09-18

## 2019-09-19 ENCOUNTER — OTHER (OUTPATIENT)
Age: 34
End: 2019-09-19

## 2019-10-15 ENCOUNTER — OTHER (OUTPATIENT)
Age: 34
End: 2019-10-15

## 2019-10-15 ENCOUNTER — APPOINTMENT (OUTPATIENT)
Dept: ORTHOPEDIC SURGERY | Facility: CLINIC | Age: 34
End: 2019-10-15

## 2020-02-07 NOTE — ED PROCEDURE NOTE - NS ED PERI NEURO NEG
The patient is a 19y Female complaining of foot pain/injury.
Pre-application: Motor, sensory, and vascular responses intact in the injured extremity./Post-application: Motor, sensory, and vascular responses intact in the injured extremity.

## 2020-07-07 NOTE — ASU DISCHARGE PLAN (ADULT/PEDIATRIC). - MEDICATION SUMMARY - MEDICATIONS TO CHANGE
Okay, perfect.   Then culture it is.   Thank you, David.     Roxann Marr MD     I will SWITCH the dose or number of times a day I take the medications listed below when I get home from the hospital:  None

## 2020-12-14 ENCOUNTER — NON-APPOINTMENT (OUTPATIENT)
Age: 35
End: 2020-12-14

## 2020-12-15 ENCOUNTER — APPOINTMENT (OUTPATIENT)
Dept: ORTHOPEDIC SURGERY | Facility: CLINIC | Age: 35
End: 2020-12-15

## 2020-12-15 VITALS — TEMPERATURE: 97.1 F

## 2020-12-30 PROBLEM — Z00.00 ENCOUNTER FOR PREVENTIVE HEALTH EXAMINATION: Noted: 2020-12-30

## 2020-12-31 ENCOUNTER — APPOINTMENT (OUTPATIENT)
Dept: ORTHOPEDIC SURGERY | Facility: CLINIC | Age: 35
End: 2020-12-31
Payer: COMMERCIAL

## 2020-12-31 ENCOUNTER — APPOINTMENT (OUTPATIENT)
Dept: ORTHOPEDIC SURGERY | Facility: CLINIC | Age: 35
End: 2020-12-31

## 2020-12-31 ENCOUNTER — TRANSCRIPTION ENCOUNTER (OUTPATIENT)
Age: 35
End: 2020-12-31

## 2020-12-31 VITALS
TEMPERATURE: 96.1 F | HEART RATE: 96 BPM | DIASTOLIC BLOOD PRESSURE: 94 MMHG | WEIGHT: 315 LBS | BODY MASS INDEX: 44.1 KG/M2 | SYSTOLIC BLOOD PRESSURE: 147 MMHG | HEIGHT: 71 IN

## 2020-12-31 DIAGNOSIS — M25.561 PAIN IN RIGHT KNEE: ICD-10-CM

## 2020-12-31 PROCEDURE — 99072 ADDL SUPL MATRL&STAF TM PHE: CPT

## 2020-12-31 PROCEDURE — 73562 X-RAY EXAM OF KNEE 3: CPT | Mod: RT

## 2020-12-31 PROCEDURE — 99214 OFFICE O/P EST MOD 30 MIN: CPT

## 2020-12-31 RX ORDER — NAPROXEN 500 MG/1
500 TABLET ORAL
Qty: 28 | Refills: 0 | Status: ACTIVE | COMMUNITY
Start: 2020-12-31 | End: 1900-01-01

## 2020-12-31 NOTE — DISCUSSION/SUMMARY
[de-identified] : Assessment: 35-year-old male with right knee pain secondary to a grade 1 MCL sprain and possible medial meniscus tear\par \par Plan: I had a long discussion with the patient today regarding the nature of their diagnosis and treatment plan.  We discussed the risks and benefits of no treatment as well as nonoperative and operative treatments.  I reviewed the patient's x-rays today with him in the office which are negative for any acute pathology.  At this time I recommend conservative treatment of the patient's condition with modalities including rest, ice, heat, anti-inflammatory medications, activity modifications, and home stretching and strengthening exercises daily.  Prescription for naproxen was sent to patient's pharmacy today.  I discussed with the patient the risks and benefits associated with NSAID use.  A referral for physical therapy was also provided begin working on range of motion and strengthening exercises for his knee.  Patient was too large for our largest short runner brace so my orthotist was contacted today and he will deliver a custom sized brace for him in the near future.  Patient has been ambulating as tolerated without assistive devices so he may continue to do so at this point.  He is encouraged use crutches if he is having significant discomfort for the next 2 weeks.  Recommend follow-up in 8 weeks for repeat clinical evaluation.  If by the time the patient remains symptomatic we will order an MRI of the knee.  Patient verbalizes understanding and agrees with the plan.  All questions were answered to his satisfaction.

## 2020-12-31 NOTE — PHYSICAL EXAM
[de-identified] : General:\par Awake, alert, no acute distress, Patient was cooperative and appropriate during the examination.\par \par The patient is morbidly obese for height and age.\par \par Walks with an antalgic gait on the right side.\par \par Full, painless range of motion of the neck and back.\par \par Exam of the bilateral lower extremities is intact and symmetric with regards to dermatologic, vascular, and neurologic exam. Bilateral lower extremity sensation is grossly intact to light touch in the DP/SP/T/S/S nerve distributions. Intact DF/PF/EHL. BIlateral lower extremity warm and well-perfused with brisk capillary refill.\par \par Pulmonary:\par Regular, nonlabored breathing\par \par Abdomen:\par Soft, nontender, nondistended.\par \par Lymphatic:\par No evidence of inguinal lymphadenopathy\par \par Right knee Examination:\par Physical examination of the knee demonstrates normal skin without signs of skin changes or abnormalities. No erythema or warmth is appreciated. There is a mild to moderate joint effusion.\par \par Sensation is intact to light touch L2-S1\par Palpable DP/PT pulse\par EHL/FHL/TA/GSC motor function intact\par \par Range of Motion\par 0 to 120 degrees with pain at terminal flexion and limited by body habitus\par \par Strength Testing\par Quadriceps/Hamstring 5/5\par Patient is able to perform a straight leg raise without difficulty.\par \par Palpation\par Not tender to palpation about the proximal tibia, or patella\par Moderately tender to palpation about the medial epicondyle or distal femur.  No tenderness palpation about the MCL insertion on the tibia.\par Not palpable defect appreciated in the quadriceps or patellar tendons\par Moderately tender to palpation of medial joint line\par Mildly tender to palpation of lateral joint line\par \par Special Tests\par Anterior Drawer negative\par Posterior Drawer negative\par Lachman Exam negative\par No Varus at zero or 30 degrees of knee flexion.  No valgus laxity at 0 degrees of knee flexion.  Grade 1 valgus laxity at 30 degrees of knee flexion.\par Jacquie's Test negative \par Active compression of the patella is negative for pain\par Translation of the patella 2 quadrants with a firm endpoint [de-identified] : X-rays including three views of the right knee were obtained in the office and reviewed with the patient today.  There is no acute fracture or dislocation.  There are mild early arthritic changes in the medial compartment joint space narrowing and early osteophyte formation.

## 2020-12-31 NOTE — HISTORY OF PRESENT ILLNESS
[de-identified] : Derrick is a pleasant 35-year-old male  presents my office today complaining of right knee pain.  He states that he slipped in the snow and his knee bent inwards approximately 2 to 3 weeks ago.  He had immediate pain and swelling in the knee and has had difficulty walking.  His pain has improved over the past several weeks, however.  He has been wearing an over-the-counter brace which is somewhat helped.  He has been ambulating without assistive devices.  He is taking anti-inflammatories as needed.  He has had no therapy or injections.  The patient denies any fevers, chills, sweats, recent illnesses, numbness, tingling, weakness, or pain elsewhere at this time.

## 2021-01-12 RX ORDER — ALBUTEROL 90 UG/1
0 AEROSOL, METERED ORAL
Qty: 0 | Refills: 0 | DISCHARGE

## 2021-01-12 RX ORDER — ACETAMINOPHEN 500 MG
2 TABLET ORAL
Qty: 0 | Refills: 0 | DISCHARGE

## 2021-08-23 PROBLEM — S99.911A UNSPECIFIED INJURY OF RIGHT ANKLE, INITIAL ENCOUNTER: Chronic | Status: ACTIVE | Noted: 2018-10-24

## 2021-08-23 PROBLEM — E66.01 MORBID (SEVERE) OBESITY DUE TO EXCESS CALORIES: Chronic | Status: ACTIVE | Noted: 2018-10-24

## 2021-08-23 PROBLEM — J45.909 UNSPECIFIED ASTHMA, UNCOMPLICATED: Chronic | Status: ACTIVE | Noted: 2018-10-24

## 2021-09-08 ENCOUNTER — APPOINTMENT (OUTPATIENT)
Dept: ORTHOPEDIC SURGERY | Facility: CLINIC | Age: 36
End: 2021-09-08
Payer: COMMERCIAL

## 2021-09-08 VITALS
DIASTOLIC BLOOD PRESSURE: 110 MMHG | SYSTOLIC BLOOD PRESSURE: 151 MMHG | HEART RATE: 110 BPM | WEIGHT: 315 LBS | HEIGHT: 71 IN | BODY MASS INDEX: 44.1 KG/M2

## 2021-09-08 PROCEDURE — 73030 X-RAY EXAM OF SHOULDER: CPT | Mod: RT

## 2021-09-08 PROCEDURE — 99213 OFFICE O/P EST LOW 20 MIN: CPT

## 2021-09-08 RX ORDER — MELOXICAM 15 MG/1
15 TABLET ORAL
Qty: 21 | Refills: 0 | Status: ACTIVE | COMMUNITY
Start: 2021-09-08 | End: 1900-01-01

## 2021-09-08 NOTE — DISCUSSION/SUMMARY
[de-identified] : Assessment: 36-year-old male with right shoulder pain\par \par Plan: I had a long discussion with the patient today regarding the nature of their diagnosis and treatment plan.  We discussed the risks and benefits of no treatment as well as nonoperative and operative treatments.  I reviewed the patient's x-rays today with him in the office which demonstrate calcific tendinitis.  He also has a type III acromion.  Patient has positive impingement signs on examination.  At this time I recommend conservative treatment of the patient's condition with modalities including rest, ice, heat, anti-inflammatory medications, activity modifications, and home stretching and strengthening exercises daily.  A prescription for meloxicam was sent to the patient's pharmacy today.  I discussed with the patient the risks and benefits associated with NSAID use.  Referral for physical therapy was provided to begin working on exercises for his shoulder to help improve his pain and his function.  Recommend follow-up in 8 weeks for repeat clinical evaluation.  The patient continues to have pain at the time we will consider either an injection or an MRI of the shoulder.\par \par The patient verbalizes understanding and agrees with plan.  All questions were answered to their satisfaction.

## 2021-09-08 NOTE — HISTORY OF PRESENT ILLNESS
[de-identified] : 9/8/21: Derrick is a pleasant 36-year-old male  who returns to the office today for new complaints of a right shoulder injury.  Patient states that he was lifting an umbrella and help his mother set up outdoor furniture on 4 July when the umbrella got away from him and he felt a tearing sensation in his shoulder.  Since that time he has had worsening pain and difficulty with activities that involve use of the shoulder.  The pain is activity related and alleviated by rest.  Hurts to reach overhead or behind him.  It hurts to sleep on his right side at night.  He has been taking Motrin as needed for pain which is mildly helpful.  He has had no therapy or injections for his shoulder before.  The patient denies any fevers, chills, sweats, recent illnesses, numbness, tingling, weakness, or pain elsewhere at this time.\par \par 12/31/20: Derrick is a pleasant 35-year-old male  presents my office today complaining of right knee pain.  He states that he slipped in the snow and his knee bent inwards approximately 2 to 3 weeks ago.  He had immediate pain and swelling in the knee and has had difficulty walking.  His pain has improved over the past several weeks, however.  He has been wearing an over-the-counter brace which is somewhat helped.  He has been ambulating without assistive devices.  He is taking anti-inflammatories as needed.  He has had no therapy or injections.  The patient denies any fevers, chills, sweats, recent illnesses, numbness, tingling, weakness, or pain elsewhere at this time.

## 2021-09-08 NOTE — PHYSICAL EXAM
[de-identified] : General:\par Awake, alert, no acute distress, Patient was cooperative and appropriate during the examination.\par \par The patient is morbidly obese for height and age.\par \par Walks without an antalgic gait.\par \par Full, painless range of motion of the neck and back.\par \par Exam of the bilateral lower extremities is intact and symmetric with regards to dermatologic, vascular, and neurologic exam. Bilateral lower extremity sensation is grossly intact to light touch in the DP/SP/T/S/S nerve distributions. Intact DF/PF/EHL. BIlateral lower extremities warm and well-perfused with brisk capillary refill.\par \par \par Pulmonary:\par Regular, nonlabored breathing\par \par Abdomen:\par Soft, nontender, nondistended.\par \par Lymphatic:\par No evidence of axillary lymphadenopathy\par \par Right shoulder Exam:\par Physical exam of the shoulder demonstrates normal skin without signs of skin changes or abnormalities. No erythema, warmth, or joint effusion appreciated. \par \par Sensation intact light touch C5-T1\par Palpable radial pulse\par Radial/ulnar/median/axillary/musculocutaneous/AIN/PIN nerves grossly intact\par \par Range of motion:\par Forward Flexion: 170\par Abduction: 40\par External Rotation: 45\par Internal Rotation: L4\par \par Palpation:\par Not tender to palpation over the glenohumeral joint\par Moderately tender palpation over the rotator cuff insertion on the greater tuberosity\par Not tender to palpation over the AC joint\par Mildly tender to palpation of the biceps tendon/bicipital groove\par \par Strength testing:\par Supraspinatus: 5/5\par Infraspinatus: 5/5\par Subscapularis: 5/5\par \par Special test:\par Empty can test positive\par Shipman impingement test positive\par Speeds test negative\par Reagan's test negative\par Lift-off test negative\par Bell-press test negative\par Cross-arm adduction test negative\par \par  [de-identified] : X-rays including 4 views of the right shoulder were obtained in the office and reviewed the patient today.  No acute fracture or dislocation.  No significant arthritis.  There is evidence of calcific tendinopathy of the rotator cuff footprint.  There is a type III acromion.

## 2021-11-02 PROBLEM — M25.511 RIGHT SHOULDER PAIN: Status: ACTIVE | Noted: 2021-09-08

## 2021-11-03 ENCOUNTER — APPOINTMENT (OUTPATIENT)
Dept: ORTHOPEDIC SURGERY | Facility: CLINIC | Age: 36
End: 2021-11-03
Payer: COMMERCIAL

## 2021-11-03 VITALS
SYSTOLIC BLOOD PRESSURE: 168 MMHG | HEART RATE: 97 BPM | WEIGHT: 315 LBS | HEIGHT: 72 IN | DIASTOLIC BLOOD PRESSURE: 123 MMHG | BODY MASS INDEX: 42.66 KG/M2

## 2021-11-03 DIAGNOSIS — M25.511 PAIN IN RIGHT SHOULDER: ICD-10-CM

## 2021-11-03 PROCEDURE — 99213 OFFICE O/P EST LOW 20 MIN: CPT

## 2021-11-03 RX ORDER — MELOXICAM 15 MG/1
15 TABLET ORAL
Qty: 21 | Refills: 0 | Status: ACTIVE | COMMUNITY
Start: 2021-11-03 | End: 1900-01-01

## 2021-11-03 NOTE — DISCUSSION/SUMMARY
[de-identified] : Assessment: 36-year-old male with right shoulder pain\par \par Plan: I had a long discussion with the patient today regarding the nature of their diagnosis and treatment plan.  We discussed the risks and benefits of no treatment as well as nonoperative and operative treatments.  I reviewed the patient's x-rays today with him in the office which demonstrate calcific tendinitis.  He also has a type III acromion.  On examination today the patient has significantly worse symptoms with respect to his strength.  He has weakness to resisted forward elevation and external rotation and is unable to lift his arm up to the horizontal because of pain and weakness.  This is new since his recent fall.  At this time I recommend an MRI of the shoulder to evaluate him for an acute traumatic rotator cuff tear.  He may continue to treat himself symptomatically on his own.  A new prescription for meloxicam was sent to the patient's pharmacy today.  I reviewed the risks and benefits associated with NSAID use.  Recommend follow-up after MRI to discuss results in person and any further recommendations. Should he have any significant full-thickness tearing of the rotator cuff surgical intervention may be indicated.\par \par The patient verbalizes understanding and agrees with plan.  All questions were answered to their satisfaction.

## 2021-11-03 NOTE — HISTORY OF PRESENT ILLNESS
[de-identified] : 11/2/21: Derrick is a pleasant 36-year-old male postop returns to the office today for follow-up regarding his right shoulder.  His previous office visit we discussed conservative treatment for his pain.  He was prescribed meloxicam and referred for physical therapy.  The patient initially had been doing very well with the physical therapy and anti-inflammatory medicines.  Unfortunately, he had an injury 2 weeks ago when he tripped and fell backwards on the stairs and landed on his an outstretched arm, jamming his right shoulder.  Since that time he has had worsening pain and weakness in the shoulder and he is currently unable to lift the arm above the horizontal.  The patient denies any fevers, chills, sweats, recent illnesses, numbness, tingling, or pain elsewhere at this time.\par \par 9/8/21: Derrick is a pleasant 36-year-old male  who returns to the office today for new complaints of a right shoulder injury.  Patient states that he was lifting an umbrella and help his mother set up outdoor furniture on 4 July when the umbrella got away from him and he felt a tearing sensation in his shoulder.  Since that time he has had worsening pain and difficulty with activities that involve use of the shoulder.  The pain is activity related and alleviated by rest.  Hurts to reach overhead or behind him.  It hurts to sleep on his right side at night.  He has been taking Motrin as needed for pain which is mildly helpful.  He has had no therapy or injections for his shoulder before.  The patient denies any fevers, chills, sweats, recent illnesses, numbness, tingling, weakness, or pain elsewhere at this time.\par \par 12/31/20: Derrick is a pleasant 35-year-old male  presents my office today complaining of right knee pain.  He states that he slipped in the snow and his knee bent inwards approximately 2 to 3 weeks ago.  He had immediate pain and swelling in the knee and has had difficulty walking.  His pain has improved over the past several weeks, however.  He has been wearing an over-the-counter brace which is somewhat helped.  He has been ambulating without assistive devices.  He is taking anti-inflammatories as needed.  He has had no therapy or injections.  The patient denies any fevers, chills, sweats, recent illnesses, numbness, tingling, weakness, or pain elsewhere at this time.

## 2021-11-03 NOTE — PHYSICAL EXAM
[de-identified] : General:\par Awake, alert, no acute distress, Patient was cooperative and appropriate during the examination.\par \par The patient is morbidly obese for height and age.\par \par Walks without an antalgic gait.\par \par Full, painless range of motion of the neck and back.\par \par Exam of the bilateral lower extremities is intact and symmetric with regards to dermatologic, vascular, and neurologic exam. Bilateral lower extremity sensation is grossly intact to light touch in the DP/SP/T/S/S nerve distributions. Intact DF/PF/EHL. BIlateral lower extremities warm and well-perfused with brisk capillary refill.\par \par \par Pulmonary:\par Regular, nonlabored breathing\par \par Abdomen:\par Soft, nontender, nondistended.\par \par Lymphatic:\par No evidence of axillary lymphadenopathy\par \par Right shoulder Exam:\par Physical exam of the shoulder demonstrates normal skin without signs of skin changes or abnormalities. No erythema, warmth, or joint effusion appreciated. \par \par Sensation intact light touch C5-T1\par Palpable radial pulse\par Radial/ulnar/median/axillary/musculocutaneous/AIN/PIN nerves grossly intact\par \par Range of motion:\par Forward Flexion: 50 active, 90 passive limited by pain\par Abduction: 50 active, 90 passive limited by pain\par External Rotation: 40 active and passive\par Internal Rotation: L4\par \par Palpation:\par Not tender to palpation over the glenohumeral joint\par Moderately tender palpation over the rotator cuff insertion on the greater tuberosity\par Not tender to palpation over the AC joint\par Moderately tender to palpation of the biceps tendon/bicipital groove\par \par Strength testing:\par Supraspinatus: 4/5\par Infraspinatus: 4/5\par Subscapularis: 5/5\par \par Special test:\par Empty can test positive\par Shipman impingement test positive\par Speeds test positive\par Inyo's test negative\par Lift-off test negative\par Bell-press test negative\par Cross-arm adduction test negative\par \par  [de-identified] : X-rays including 4 views of the right shoulder from my office on 9/8/2021 were reviewed again today.  No acute fracture or dislocation.  No significant arthritis.  There is evidence of calcific tendinopathy of the rotator cuff footprint.  There is a type III acromion.

## 2021-11-22 ENCOUNTER — APPOINTMENT (OUTPATIENT)
Dept: MRI IMAGING | Facility: CLINIC | Age: 36
End: 2021-11-22

## 2022-02-22 NOTE — ED ADULT NURSE NOTE - NSFALLRSKASSESASSIST_ED_ALL_ED
Report received from Adam oLu RN care assumed. Pt resting quietly in bed with call light in reach. No complaints at this time. العلي draining clear urine at this time, approximately 400ml in العلي bag at this time. no

## 2022-07-01 ENCOUNTER — APPOINTMENT (OUTPATIENT)
Dept: ORTHOPEDIC SURGERY | Facility: CLINIC | Age: 37
End: 2022-07-01

## 2022-10-13 NOTE — H&P PST ADULT - CIGARETTES, PACKS/DAY
Calling pt re f/u care with [facility].  LVM with contact information and need for f/u care.   
0.25

## 2022-12-19 NOTE — ASU PATIENT PROFILE, ADULT - LEARNING ASSESSMENT (PATIENT) ADDITIONAL COMMENTS
Called pt, no answer, LVMTCB Instructed pt on pre-op instructions, tips for safer surgery, pain management scale, pre-surgical infection prevention instructions, understanding of all instructions verbalized. Instructed pt on pre-op instructions, tips for safer surgery, pain management scale, pre-surgical infection prevention instructions, medical clearance required as per PST, to be scheduled, understanding of all instructions verbalized.

## 2025-05-04 NOTE — ED PROVIDER NOTE - CARE PLAN
The skin at the access site was anesthetized. Using a flashback needle and a micropuncture needle with ultrasound ([employer]) the right femoral artery was succesfully accessed times 1 in a retrograde fashion over the guidewire using a INTRODUCER SHTH OD6 FR L11 CM GRN HUB SNAP FIT DIL TBG GW. Ultrasound found the vessel was patent. A permanent recording was saved. Principal Discharge DX:	Moderate persistent asthma with exacerbation